# Patient Record
Sex: FEMALE | HISPANIC OR LATINO | Employment: FULL TIME
[De-identification: names, ages, dates, MRNs, and addresses within clinical notes are randomized per-mention and may not be internally consistent; named-entity substitution may affect disease eponyms.]

---

## 2020-07-22 ENCOUNTER — TELEPHONE (OUTPATIENT)
Dept: SCHEDULING | Age: 35
End: 2020-07-22

## 2020-08-31 ENCOUNTER — LAB SERVICES (OUTPATIENT)
Dept: LAB | Age: 35
End: 2020-08-31

## 2020-08-31 ENCOUNTER — OFFICE VISIT (OUTPATIENT)
Dept: FAMILY MEDICINE | Age: 35
End: 2020-08-31

## 2020-08-31 VITALS
OXYGEN SATURATION: 100 % | DIASTOLIC BLOOD PRESSURE: 83 MMHG | TEMPERATURE: 97.7 F | BODY MASS INDEX: 20.38 KG/M2 | SYSTOLIC BLOOD PRESSURE: 117 MMHG | HEIGHT: 64 IN | RESPIRATION RATE: 18 BRPM | HEART RATE: 85 BPM | WEIGHT: 119.38 LBS

## 2020-08-31 DIAGNOSIS — T78.1XXA FOOD SENSITIVITY WITH GASTROINTESTINAL SYMPTOMS: ICD-10-CM

## 2020-08-31 DIAGNOSIS — Z00.00 ANNUAL PHYSICAL EXAM: Primary | ICD-10-CM

## 2020-08-31 DIAGNOSIS — R63.4 WEIGHT LOSS, UNINTENTIONAL: ICD-10-CM

## 2020-08-31 DIAGNOSIS — Z12.4 PAP SMEAR FOR CERVICAL CANCER SCREENING: ICD-10-CM

## 2020-08-31 DIAGNOSIS — D64.9 ANEMIA, UNSPECIFIED TYPE: ICD-10-CM

## 2020-08-31 DIAGNOSIS — Z23 NEED FOR PNEUMOCOCCAL VACCINATION: ICD-10-CM

## 2020-08-31 DIAGNOSIS — R21 RASH AND NONSPECIFIC SKIN ERUPTION: ICD-10-CM

## 2020-08-31 DIAGNOSIS — Z00.00 ANNUAL PHYSICAL EXAM: ICD-10-CM

## 2020-08-31 DIAGNOSIS — Z23 NEED FOR TDAP VACCINATION: ICD-10-CM

## 2020-08-31 DIAGNOSIS — F17.210 CIGARETTE NICOTINE DEPENDENCE WITHOUT COMPLICATION: ICD-10-CM

## 2020-08-31 LAB
ALBUMIN SERPL-MCNC: 4.1 G/DL (ref 3.6–5.1)
ALBUMIN/GLOB SERPL: 1.6 {RATIO} (ref 1–2.4)
ALP SERPL-CCNC: 56 UNITS/L (ref 45–117)
ALT SERPL-CCNC: 18 UNITS/L
ANION GAP SERPL CALC-SCNC: 9 MMOL/L (ref 10–20)
AST SERPL-CCNC: 16 UNITS/L
BASOPHILS # BLD: 0.1 K/MCL (ref 0–0.3)
BASOPHILS NFR BLD: 1 %
BILIRUB SERPL-MCNC: 0.6 MG/DL (ref 0.2–1)
BUN SERPL-MCNC: 11 MG/DL (ref 6–20)
BUN/CREAT SERPL: 32 (ref 7–25)
CALCIUM SERPL-MCNC: 9.7 MG/DL (ref 8.4–10.2)
CHLORIDE SERPL-SCNC: 107 MMOL/L (ref 98–107)
CO2 SERPL-SCNC: 29 MMOL/L (ref 21–32)
CREAT SERPL-MCNC: 0.35 MG/DL (ref 0.51–0.95)
DIFFERENTIAL METHOD BLD: ABNORMAL
EOSINOPHIL # BLD: 0.2 K/MCL (ref 0.1–0.5)
EOSINOPHIL NFR BLD: 3 %
ERYTHROCYTE [DISTWIDTH] IN BLOOD: 15 % (ref 11–15)
GLOBULIN SER-MCNC: 2.6 G/DL (ref 2–4)
GLUCOSE SERPL-MCNC: 83 MG/DL (ref 65–99)
HCT VFR BLD CALC: 37.9 % (ref 36–46.5)
HGB BLD-MCNC: 11.2 G/DL (ref 12–15.5)
IMM GRANULOCYTES # BLD AUTO: 0 K/MCL (ref 0–0.2)
IMM GRANULOCYTES NFR BLD: 0 %
LENGTH OF FAST TIME PATIENT: ABNORMAL HRS
LYMPHOCYTES # BLD: 2.2 K/MCL (ref 1–4.8)
LYMPHOCYTES NFR BLD: 25 %
MCH RBC QN AUTO: 20.5 PG (ref 26–34)
MCHC RBC AUTO-ENTMCNC: 29.6 G/DL (ref 32–36.5)
MCV RBC AUTO: 69.4 FL (ref 78–100)
MONOCYTES # BLD: 0.7 K/MCL (ref 0.3–0.9)
MONOCYTES NFR BLD: 9 %
NEUTROPHILS # BLD: 5.4 K/MCL (ref 1.8–7.7)
NEUTROPHILS NFR BLD: 62 %
NRBC BLD MANUAL-RTO: 0 /100 WBC
PLATELET # BLD: 407 K/MCL (ref 140–450)
POTASSIUM SERPL-SCNC: 4.7 MMOL/L (ref 3.4–5.1)
PROT SERPL-MCNC: 6.7 G/DL (ref 6.4–8.2)
RBC # BLD: 5.46 MIL/MCL (ref 4–5.2)
SODIUM SERPL-SCNC: 140 MMOL/L (ref 135–145)
TSH SERPL-ACNC: 0.62 MCUNITS/ML (ref 0.35–5)
WBC # BLD: 8.6 K/MCL (ref 4.2–11)

## 2020-08-31 PROCEDURE — 90715 TDAP VACCINE 7 YRS/> IM: CPT

## 2020-08-31 PROCEDURE — 36415 COLL VENOUS BLD VENIPUNCTURE: CPT

## 2020-08-31 PROCEDURE — 90472 IMMUNIZATION ADMIN EACH ADD: CPT

## 2020-08-31 PROCEDURE — 83550 IRON BINDING TEST: CPT | Performed by: FAMILY MEDICINE

## 2020-08-31 PROCEDURE — 90732 PPSV23 VACC 2 YRS+ SUBQ/IM: CPT

## 2020-08-31 PROCEDURE — 82728 ASSAY OF FERRITIN: CPT | Performed by: FAMILY MEDICINE

## 2020-08-31 PROCEDURE — 80050 GENERAL HEALTH PANEL: CPT | Performed by: FAMILY MEDICINE

## 2020-08-31 PROCEDURE — 90471 IMMUNIZATION ADMIN: CPT

## 2020-08-31 PROCEDURE — 99385 PREV VISIT NEW AGE 18-39: CPT | Performed by: FAMILY MEDICINE

## 2020-08-31 PROCEDURE — 83021 HEMOGLOBIN CHROMOTOGRAPHY: CPT | Performed by: FAMILY MEDICINE

## 2020-08-31 PROCEDURE — 83540 ASSAY OF IRON: CPT | Performed by: FAMILY MEDICINE

## 2020-08-31 RX ORDER — TRIAMCINOLONE ACETONIDE 1 MG/G
CREAM TOPICAL 2 TIMES DAILY PRN
Qty: 15 G | Refills: 1 | Status: SHIPPED | OUTPATIENT
Start: 2020-08-31

## 2020-08-31 ASSESSMENT — PATIENT HEALTH QUESTIONNAIRE - PHQ9
SUM OF ALL RESPONSES TO PHQ9 QUESTIONS 1 AND 2: 2
CLINICAL INTERPRETATION OF PHQ9 SCORE: NO FURTHER SCREENING NEEDED
2. FEELING DOWN, DEPRESSED OR HOPELESS: SEVERAL DAYS
CLINICAL INTERPRETATION OF PHQ2 SCORE: NO FURTHER SCREENING NEEDED
1. LITTLE INTEREST OR PLEASURE IN DOING THINGS: SEVERAL DAYS
SUM OF ALL RESPONSES TO PHQ9 QUESTIONS 1 AND 2: 2

## 2020-09-01 LAB
FERRITIN SERPL-MCNC: 44 NG/ML (ref 8–252)
IRON SATN MFR SERPL: 29 % (ref 15–45)
IRON SERPL-MCNC: 102 MCG/DL (ref 50–170)
TIBC SERPL-MCNC: 350 MCG/DL (ref 250–450)

## 2020-09-04 PROBLEM — D56.3 BETA THALASSEMIA TRAIT: Status: ACTIVE | Noted: 2020-09-04

## 2020-09-04 LAB
HGB A1 MFR BLD ELPH: 95.2 % (ref 96.4–98.2)
HGB A2 MFR BLD ELPH: 4.8 % (ref 1.8–3.4)
HGB C MFR BLD ELPH: ABNORMAL %
HGB F MFR BLD ELPH: ABNORMAL %
HGB FRACT BLD ELPH-IMP: ABNORMAL
HGB OTHER MFR BLD ELPH: ABNORMAL %
HGB S MFR BLD ELPH: ABNORMAL %

## 2020-09-08 LAB — HPV16+18+45 E6+E7MRNA CVX NAA+PROBE: NORMAL

## 2024-01-10 ENCOUNTER — TELEPHONE (OUTPATIENT)
Facility: CLINIC | Age: 39
End: 2024-01-10

## 2024-01-10 NOTE — TELEPHONE ENCOUNTER
Spoke to patient    D/t her ongoing GI symptoms including rectal bleeding, I rescheduled her appt with Emily to tomorrow.    Location, date, and time confirmed with patient.    Your Appointments      Thursday January 11, 2024  1:30 PM  Consult with CELSA Iraheta  [facility], [facility] ([facility]) [address] [place] [id number]  [phone]

## 2024-01-10 NOTE — TELEPHONE ENCOUNTER
Patient calling is scheduled with Emily Ac 2/14/224. States has blood in tool if anything can be done to be seen sooner, had recent ER visit at [facility]. Please call.

## 2024-01-11 ENCOUNTER — OFFICE VISIT (OUTPATIENT)
Dept: GASTROENTEROLOGY | Facility: CLINIC | Age: 39
End: 2024-01-11

## 2024-01-11 VITALS
BODY MASS INDEX: 23.05 KG/M2 | HEIGHT: 64 IN | DIASTOLIC BLOOD PRESSURE: 83 MMHG | WEIGHT: 135 LBS | HEART RATE: 71 BPM | SYSTOLIC BLOOD PRESSURE: 136 MMHG

## 2024-01-11 DIAGNOSIS — K57.92 DIVERTICULITIS: ICD-10-CM

## 2024-01-11 DIAGNOSIS — R14.0 BLOATING: ICD-10-CM

## 2024-01-11 DIAGNOSIS — R12 HEARTBURN: ICD-10-CM

## 2024-01-11 DIAGNOSIS — K62.5 BRBPR (BRIGHT RED BLOOD PER RECTUM): Primary | ICD-10-CM

## 2024-01-11 PROCEDURE — 3079F DIAST BP 80-89 MM HG: CPT | Performed by: NURSE PRACTITIONER

## 2024-01-11 PROCEDURE — 3075F SYST BP GE 130 - 139MM HG: CPT | Performed by: NURSE PRACTITIONER

## 2024-01-11 PROCEDURE — 99204 OFFICE O/P NEW MOD 45 MIN: CPT | Performed by: NURSE PRACTITIONER

## 2024-01-11 PROCEDURE — 3008F BODY MASS INDEX DOCD: CPT | Performed by: NURSE PRACTITIONER

## 2024-01-11 RX ORDER — POLYETHYLENE GLYCOL 3350, SODIUM CHLORIDE, SODIUM BICARBONATE, POTASSIUM CHLORIDE 420; 11.2; 5.72; 1.48 G/4L; G/4L; G/4L; G/4L
POWDER, FOR SOLUTION ORAL
Qty: 4000 ML | Refills: 0 | Status: SHIPPED | OUTPATIENT
Start: 2024-01-11

## 2024-01-11 RX ORDER — DICYCLOMINE HCL 20 MG
TABLET ORAL
COMMUNITY

## 2024-01-11 RX ORDER — OMEPRAZOLE 20 MG/1
1 CAPSULE, DELAYED RELEASE ORAL
COMMUNITY
Start: 2023-12-13

## 2024-01-11 NOTE — PATIENT INSTRUCTIONS
-fibercon or citrucel in addition to probiotic  -sitz baths  -prep h twice daily x 2 weeks   -labs  -avoidance of dairy  -reflux diet modification, stop vaping  -hpylori testing  -er if condition decline    1. Schedule colonoscopy with [facility] w/ Dr. Ortiz or Dr. Dela Cruz [Diagnosis: brbpr, diverticulitis]    2.  bowel prep from pharmacy (split trilyte)    3. Continue all medications as normal for your procedure.    4. Read all bowel prep instructions carefully. Bowel prep instructions can also be found online at:  www.eehealth.org/giprep     5. AVOID seeds, nuts, popcorn, raw fruits and vegetables for 3 days before procedure    6. You MAY need to go for COVID testing 72 hours before procedure. The testing team will call you a few days before your procedure to discuss with you if testing is required. If you are asked to go for COVID testing and do not completed the test, the procedure cannot be performed.     7. If you start any NEW medication after your visit today, please notify us. Certain medications (like iron or weight loss medications) will need to be held before the procedure, or the procedure cannot be performed safely.     Tips to Control Acid Reflux    To control acid reflux, you’ll need to make some basic diet and lifestyle changes. The simple steps outlined below may be all you’ll need to ease discomfort.   Watch what you eat  Don't have fatty foods or spicy foods.  Eat fewer acidic foods, such as citrus and tomato-based foods. These can increase symptoms.  Limit drinking alcohol, caffeine, and fizzy beverages. All increase acid reflux.  Try limiting chocolate, peppermint, and spearmint. These can make acid reflux worse in some people.     Watch when you eat  Don't lie down for 3 hours after eating.  Don't snack before going to bed.     Raise your head  Raising your head and upper body by 4 to 6 inches helps limit reflux when you’re lying down. Put blocks under the head of your bed frame or a wedge  under your mattress to raise it.   Other changes  Lose weight, if you need to  Don’t exercise near bedtime  Don't wear tight-fitting clothes  Limit aspirin and ibuprofen  Stop smoking     Allie last reviewed this educational content on 6/1/2019  © 0700-1994 [employer] [facility] [address] [place] [id number]. All rights reserved. This information is not intended as a substitute for professional medical care. Always follow your healthcare professional's instructions.

## 2024-01-11 NOTE — H&P
[facility] - Gastroenterology                                                                                                               Reason for consult: eval    Requesting physician or provider: No primary care provider on file.    Chief Complaint   Patient presents with    Blood In Stool    Diverticulitis       HPI:   Ghazala Moeller is a 38 year old year-old female without significant PMHx:     she is here today for evaluation brbpr  She says yesterday had bm and saw brbpr.  No h/o symptoms.  Has issues with constipation improved with probiotic. She denies rectal pain. No melena. No clots.    Has bm today and did not have bleeding.    Was following at [facility]  ED visit 9/24/22 for LLQ pain. Had a CT abdomen which was sugggestive of diverticulitis and was treated with Augmentin  She presents today stating her LLQ pain is much better now. Only has mild LLQ discomfort     S/o bloating 2/2 milk, cheese - celiac panel neg  Llq pain, constipation - started dicyclomine  Recommendation for c-scope  She cancelled appt    she moves her bowels daily and no straining typically. No diarrhea.     H/o heartburn. No sx lately.  she denies dysphagia, odynophagia and/or globus. Has bloating with dairy chronically. she denies nausea and/or vomiting.  she denies recent change in appetite and/or unintentional weight loss.    NSAIDS: no  Tobacco: vape  Alcohol: 1 drink every 2 weeks  Marijuana: no  Illicit drugs: no    No FH GI malignancy, ibd, celiac    No history of adverse reaction to sedation  No WENCESLAO  No anticoagulants  No pacemaker/defibrillator  No pain medications and/or sleep aides      Last colonoscopy: no  Last EGD: no    Wt Readings from Last 6 Encounters:   01/11/24 135 lb (61.2 kg)        History, Medications, Allergies, ROS:      Past Medical History:   Diagnosis Date    Diverticular disease       History reviewed. No pertinent  surgical history.   Family Hx:   Family History   Problem Relation Age of Onset    Other (Other) Father     Other (Other) Mother       Social History:   Social History     Socioeconomic History    Marital status: Single   Tobacco Use    Smoking status: Never    Smokeless tobacco: Never   Vaping Use    Vaping Use: Every day    Substances: Nicotine, Flavoring    Devices: Disposable   Substance and Sexual Activity    Drug use: Never        Medications (Active prior to today's visit):  Current Outpatient Medications   Medication Sig Dispense Refill    dicyclomine 20 MG Oral Tab TAKE 1 TABLET BY MOUTH THREE TIMES DAILY BEFORE MEALS AND AT BEDTIME      omeprazole 20 MG Oral Capsule Delayed Release Take 1 capsule (20 mg total) by mouth before breakfast.         Allergies:  No Known Allergies    ROS:   CONSTITUTIONAL: negative for fevers, chills, sweats and weight loss  EYES Negative for red eyes, yellow eyes, changes in vision  HEENT: Negative for dysphagia and hoarseness  RESPIRATORY: Negative for cough and shortness of breath  CARDIOVASCULAR: Negative for chest pain, palpitations  GASTROINTESTINAL: See HPI  GENITOURINARY: Negative for dysuria and frequency  MUSCULOSKELETAL: Negative for arthralgias and myalgias  NEUROLOGICAL: Negative for dizziness and headaches  BEHAVIOR/PSYCH: Negative for anxiety and poor appetite    PHYSICAL EXAM:   Blood pressure 136/83, pulse 71, height 5' 4\" (1.626 m), weight 135 lb (61.2 kg), last menstrual period 01/06/2024.    GEN: WD/WN, NAD  HEENT: Supple symmetrical, trachea midline  CV: RRR, the extremities are warm and well perfused   LUNGS: No increased work of breathing  ABDOMEN: No scars, normal bowel sounds, soft, non-tender, non-distended no rebound or guarding, no masses, no hepatomegaly  MSK: No redness, no warmth, no swelling of joints  SKIN: No jaundice, no erythema, no rashes  HEMATOLOGIC: No bleeding, no bruising  NEURO: Alert and interactive, normal gait  RECTAL: EXT prolapsed  hemorrhoids, no fissure INT no masses, pain, bleeding    Labs/Imaging/Procedures:     CT Abdomen Pelvis without IV Contrast    Impression  1. Focal bowel wall thickening of the descending colon with marked adjacent fat stranding suggestive of acute diverticulitis. Given the presence of multiple colonic diverticula, including 1 of which is at the site of focal bowel thickening, a nonspecific colitis is considered less likely.  2. Mildly distended appearance of the adjacent small bowel loops, which remain nondilated, likely reactive.  3. Mild hepatomegaly.  The *result* was reported verbally by Ean Torres M.D. to Dr. Harinder Dc at 14:29 on 9/24/2022, less than 60 minutes after the diagnosis was made.    ATTENDING PHYSICIAN NOTE: Agree with above. Extensive colonic diverticulosis with short segment of circumferential wall thickening and an inflamed diverticulum with extensive pericolonic fat stranding, consistent with acute diverticulitis. No adjacent fluid collection or pneumoperitoneum to suggest perforation.    I, Stiven Valles M.D. have personally reviewed this report and concur with its findings and conclusions, as affirmed by my electronic signature.    Resident/Fellow: aEn Torres on 09/24/2022 2:31 PM    Attending: Stiven Valles on 09/25/2022 8:04 AM        .  ASSESSMENT/PLAN:   Ghazala Moeller is a 38 year old year-old female without significant PMHx:     #diverticulitis  #brbpr  She is here today for eval. H/o diverticulitis on imaging 2022.  Sx improved with augmentin. NO recurrence since.  C-scope advised, but did not have procedure 2/2 cost. Here today for brbpr x 1 in setting of constipation.  No melena, unintentional weight loss, fhx gi malignancy and/or IBD. Rectal exam remarkable for prolapsed hemorrhoid.  Plan for cln.    #heartburn  Asymptomatic at present. CTM    #bloating  Sx occur with dairy. Suspect constipation component. Celiac serology testing negative.  No recent issues. Will get hp  testing. Consider further testing if clinically indicated.    -fibercon or citrucel in addition to probiotic  -sitz baths  -prep h twice daily x 2 weeks   -labs  -avoidance of dairy  -reflux diet modification, stop vaping  -hpylori testing  -er if condition decline    1. Schedule colonoscopy with [facility] w/ Dr. Ortiz or Dr. Dela Cruz [Diagnosis: brbpr, diverticulitis]    2.  bowel prep from pharmacy (split trilyte)    3. Continue all medications as normal for your procedure.    4. Read all bowel prep instructions carefully. Bowel prep instructions can also be found online at:  www.eehealth.org/giprep     5. AVOID seeds, nuts, popcorn, raw fruits and vegetables for 3 days before procedure    6. You MAY need to go for COVID testing 72 hours before procedure. The testing team will call you a few days before your procedure to discuss with you if testing is required. If you are asked to go for COVID testing and do not completed the test, the procedure cannot be performed.     7. If you start any NEW medication after your visit today, please notify us. Certain medications (like iron or weight loss medications) will need to be held before the procedure, or the procedure cannot be performed safely.     Orders This Visit:  No orders of the defined types were placed in this encounter.      Meds This Visit:  Requested Prescriptions      No prescriptions requested or ordered in this encounter       Imaging & Referrals:  None  ENDOSCOPIC RISK BENEFIT DISCUSSION: I described the procedure in great detail with the patient. I discussed the risks and benefits, including but not limited to: bleeding, perforation, infection, anesthesia complications, and even death. Patient will be NPO after midnight and will have a person physically present at time of pick-up to drive patient home. Patient verbalized understanding and agrees to proceed with procedure as planned.      Emily Ac, APRN   1/11/2024        This note was  partially prepared using Dragon [employer] voice recognition dictation software. As a result, errors may occur. When identified, these errors have been corrected. While every attempt is made to correct errors during dictation, discrepancies may still exist.

## 2024-01-13 ENCOUNTER — TELEPHONE (OUTPATIENT)
Facility: CLINIC | Age: 39
End: 2024-01-13

## 2024-01-13 DIAGNOSIS — K62.5 BRBPR (BRIGHT RED BLOOD PER RECTUM): Primary | ICD-10-CM

## 2024-01-13 DIAGNOSIS — K57.92 DIVERTICULITIS: ICD-10-CM

## 2024-01-13 NOTE — TELEPHONE ENCOUNTER
Scheduled for: Colonoscopy [address], [address]   Provider Name:    Date: 4/18/2024   Location:  [facility]  Sedation:  MAC  Time:  2:00 pm, (pt is aware to arrive at 1:00 pm)  Prep:  Trilyte  Meds/Allergies Reconciled?: Emily/NP reviewed.   Diagnosis with codes:  Bright Red Blood Per Rectum K62.5  Was patient informed to call insurance with codes (Y/N):  Yes   Referral sent?: Referral was sent at the time of electronic surgical scheduling.   EM or [facility] notified?:   Electronic case request was sent to [facility] via [employer].  Medication Orders:  N/A  Misc Orders: N/A      Further instructions given by staff: I discussed the prep instructions with the patient which she verbally understood. Provided patient with prep instructions and cancellation policy at the time of office visit.

## 2024-03-11 ENCOUNTER — TELEPHONE (OUTPATIENT)
Facility: CLINIC | Age: 39
End: 2024-03-11

## 2024-03-11 NOTE — TELEPHONE ENCOUNTER
1st,overdue reminder letter mailed out to patient   Labs order     Orders Placed on 1/11/2024    CBC W Differential W Platelet  Comp Metabolic Panel (14)  Helicobacter Pylori Breath Test, Adult  TSH W Reflex To Free T4

## 2024-04-10 ENCOUNTER — TELEPHONE (OUTPATIENT)
Facility: CLINIC | Age: 39
End: 2024-04-10

## 2024-04-10 NOTE — TELEPHONE ENCOUNTER
Message from PAT-    Patient wants to cancel  procedure because she has a work trip scheduled . Patient instructed to call clinic to reschedule. Patient verbalized understanding.       Cancelled in epic and endo      CANCELLED for: Colonoscopy 62105, [address]   Provider Name:    Date: 4/18/2024   Location:  [facility]  Sedation:  MAC  Time:  2:00 pm

## (undated) NOTE — LETTER
3/11/2024              Ghazala Moeller        [address]        [facility] [address]         Dear Ghazala,    Our records indicate that the tests ordered for you by CELSA Hernandez  have not been done.  If you have, in fact, already completed the tests or you do not wish to have the tests done, please contact our office at THE NUMBER LISTED BELOW.  Otherwise, please proceed with the testing.    To schedule a test at any [facility] Facility, call Central Scheduling at [phone], Monday through Friday between 7:30am to 6pm and on Saturday between 8am and 1pm.   Evening and weekend appointments for your exam are available.   Please call [facility] at [phone] to schedule this test order.      Labs order   Orders Placed on 1/11/2024    CBC W Differential W Platelet  Comp Metabolic Panel (14)  Helicobacter Pylori Breath Test, Adult -Please go to the lab to complete this test. Make sure you do not take a PPI (omeprazole/pantoprazole) or H2 blocker (famotidine) medication for 2 weeks prior to the test as this could result in a false negative result. Also do not eat one hour prior to the test.    H-pylori breath test     This test requires the adult patient (>17 years of age) to fast for 1 hour prior to test administration. The patient should not have taken antibiotics, proton pump inhibitors (e.g., Prilosec, Prevacid, Aciphex, Nexium), or bismuth preparations (e.g., Pepto-Bismol) within the previous 14 days.   The effect of histamine 2-receptor antogonists (e.g., Axid, Pepcid, Tagamet, Zantac) may reduce urease activity on urea breath tests and should be discontinued for 24-48 hours before the sample is collected.   When used to monitor treatment, the test should be performed four weeks after cessation of definitive therapy. The patient should be informed that the Pranactin-Citric drink that will be administered contains phenylalanine. Phenylketonurics restrict dietary  phenylalanine.       TSH W Reflex To Free T4    Sincerely,    Emily Ac, APRN  [facility], [facility]  [address]  [facility] [address] [phone]